# Patient Record
Sex: MALE | Race: WHITE | NOT HISPANIC OR LATINO | URBAN - METROPOLITAN AREA
[De-identification: names, ages, dates, MRNs, and addresses within clinical notes are randomized per-mention and may not be internally consistent; named-entity substitution may affect disease eponyms.]

---

## 2022-03-16 ENCOUNTER — TELEPHONE (OUTPATIENT)
Dept: FAMILY MEDICINE CLINIC | Facility: CLINIC | Age: 22
End: 2022-03-16

## 2022-05-26 NOTE — TELEPHONE ENCOUNTER
05/26/22 5:33 PM     Thank you for your request  Per chart patient following with an [facility] provider  Please do not add a [facility] PCP office to a patient's chart or an external PCP to the chart  This message will now be completed      Thank you  Mamie Zamora